# Patient Record
Sex: FEMALE | Race: WHITE
[De-identification: names, ages, dates, MRNs, and addresses within clinical notes are randomized per-mention and may not be internally consistent; named-entity substitution may affect disease eponyms.]

---

## 2018-10-06 ENCOUNTER — HOSPITAL ENCOUNTER (EMERGENCY)
Dept: HOSPITAL 47 - EC | Age: 73
LOS: 1 days | Discharge: HOME | End: 2018-10-07
Payer: MEDICARE

## 2018-10-06 DIAGNOSIS — R40.2142: ICD-10-CM

## 2018-10-06 DIAGNOSIS — Z88.1: ICD-10-CM
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
## 2018-10-06 DIAGNOSIS — R40.2252: ICD-10-CM

## 2018-10-06 DIAGNOSIS — Z88.2: ICD-10-CM
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
## 2018-10-06 DIAGNOSIS — R40.2362: ICD-10-CM

## 2018-10-06 DIAGNOSIS — Z23: ICD-10-CM

## 2018-10-06 DIAGNOSIS — S01.01XA: Primary | ICD-10-CM
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
## 2018-10-06 PROCEDURE — 90471 IMMUNIZATION ADMIN: CPT

## 2018-10-06 PROCEDURE — 72125 CT NECK SPINE W/O DYE: CPT

## 2018-10-06 PROCEDURE — 70450 CT HEAD/BRAIN W/O DYE: CPT

## 2018-10-06 PROCEDURE — 99283 EMERGENCY DEPT VISIT LOW MDM: CPT

## 2018-10-06 PROCEDURE — 90715 TDAP VACCINE 7 YRS/> IM: CPT

## 2018-10-06 PROCEDURE — 12002 RPR S/N/AX/GEN/TRNK2.6-7.5CM: CPT

## 2018-10-07 VITALS — RESPIRATION RATE: 17 BRPM | HEART RATE: 73 BPM | DIASTOLIC BLOOD PRESSURE: 66 MMHG | SYSTOLIC BLOOD PRESSURE: 147 MMHG

## 2018-10-07 VITALS — TEMPERATURE: 98.2 F

## 2018-10-07 NOTE — ED
General Adult HPI





- General


Chief complaint: Head Injury


Stated complaint: head injury


Time Seen by Provider: 10/07/18 01:11


Source: patient, RN notes reviewed


Mode of arrival: ambulatory


Limitations: no limitations





- History of Present Illness


Initial comments: 





73-year-old female with a past medical history of GERD, hyperlipidemia, 

hypertension presents to the emergency department for a chief complaint of head 

injury.  Patient states she was trying to open her storm door when she slipped 

and fell backwards hitting the back of her head.  Patient denies any headache 

at this time but does state the laceration hurts.  Patient denies any loss of 

consciousness.  Patient denies being on any blood thinners.  Patient is not up-

to-date on tetanus.Patient has no other complaints at this time including 

shortness of breath, chest pain, abdominal pain, nausea or vomiting, headache, 

or visual changes.





- Related Data


 Allergies











Allergy/AdvReac Type Severity Reaction Status Date / Time


 


nitrofurantoin Allergy  Swelling Verified 10/07/18 00:09





[From Macrobid]     


 


Sulfa (Sulfonamide Allergy  Swelling Verified 10/07/18 00:09





Antibiotics)     


 


sulfamethoxazole Allergy  Swelling Verified 10/07/18 00:09





[From Bactrim]     


 


trimethoprim [From Bactrim] Allergy  Swelling Verified 10/07/18 00:09














Review of Systems


ROS Statement: 


Those systems with pertinent positive or pertinent negative responses have been 

documented in the HPI.





ROS Other: All systems not noted in ROS Statement are negative.





Past Medical History


Past Medical History: GERD/Reflux, Hyperlipidemia, Hypertension


Additional Past Medical History / Comment(s): currently on antibiotics for 

bladder infection on doxy


History of Any Multi-Drug Resistant Organisms: None Reported


Past Surgical History: Hysterectomy


Past Psychological History: No Psychological Hx Reported


Smoking Status: Never smoker


Past Alcohol Use History: Occasional


Past Drug Use History: None Reported





General Exam


Limitations: no limitations


General appearance: alert, in no apparent distress


Head exam: Present: normocephalic.  Absent: atraumatic (Patient does have a 3 

cm laceration to the right parietal bone)


Eye exam: Present: normal appearance.  Absent: scleral icterus, conjunctival 

injection


ENT exam: Present: normal exam, normal oropharynx, mucous membranes moist, TM's 

normal bilaterally, normal external ear exam


Neck exam: Present: normal inspection, full ROM.  Absent: tenderness, 

meningismus, lymphadenopathy


Respiratory exam: Present: normal lung sounds bilaterally.  Absent: respiratory 

distress, wheezes, rales, rhonchi, stridor


Cardiovascular Exam: Present: regular rate, normal rhythm, normal heart sounds.

  Absent: systolic murmur, diastolic murmur, rubs, gallop, clicks


Neurological exam: Present: alert, oriented X3, CN II-XII intact


  ** Expanded


Patient oriented to: Present: person, place, time


Speech: Present: fluid speech


Cranial nerves: EOM's Intact: Normal, Tongue Deviation: Normal, Nystagmus: 

Normal, Facial Sensation: Normal


Cerebellar function: Finger to Nose: Normal, Heel to Shin: Normal, Romberg: 

Normal


Upper motor neuron: Pronator Drift: Normal


Sensory exam: Upper Extremity Light Touch: Normal, Upper Extremity Pin Prick: 

Normal, Lower Extremity Light Touch: Normal, Lower Extremity Pin Prick: Normal


Motor strength exam: RUE: 5, LUE: 5, RLE: 5, LLE: 5


Eye Response: (4) open spontaneously


Motor Response: (6) obeys commands


Verbal Response: (5) oriented


[place] Total: 15


Psychiatric exam: Present: normal affect (Patient is well-appearing.  She is 

alert and responsive.  She is pleasant and interactive.), normal mood





Course


 Vital Signs











  10/07/18 10/07/18





  00:01 02:46


 


Temperature 98.2 F 


 


Pulse Rate 84 73


 


Respiratory 16 17





Rate  


 


Blood Pressure 141/84 147/66


 


O2 Sat by Pulse 96 99





Oximetry  














Procedures





- Laceration


  ** Laceration #1


Consent Obtained: verbal consent


Time Out Performed: Yes


Indication: laceration


Site: scalp


Size (cm): 3


Description: linear


Depth: simple, single layer


Anesthetic Used: lidocaine 1%


Anesthesia Technique: local infiltration


Amount (mls): 4


Pre-repair: wound explored, irrigated extensively (Irrigated extensively with 

saline), deep structures intact


Type of Sutures: other (Staples)


Number of Sutures: 6


Patient Tolerated Procedure: well, no complications





Medical Decision Making





- Medical Decision Making





73-year-old presents to the emergency room for chief light of head injury.  

Patient fell backwards hitting her head on the cement.  Patient denies loss of 

consciousness or blood thinners.  Patient denies headache at this time but does 

state the area around the laceration is painful.  Patient is not up-to-date on 

tetanus.  On exam no focal neuro deficits.  Patient is ambulatory with a normal 

gait.  No fracture evident in the C-spine.  No acute intracranial abnormality.  

Laceration is about 3 cm on the right parietal bone.  This was cleaned 

thoroughly and stapled.  Patient will follow up with primary care in 1-2 days.  

She will return if Staples removed in 7-10 days.  She will return if she has 

any worsening symptoms.  Concussion precautions were given to her. Discussed 

with Dr Vazquez.





Disposition


Clinical Impression: 


 Head injury





Disposition: HOME SELF-CARE


Condition: Good


Instructions:  Concussion (ED), Staple Care (ED)


Additional Instructions: 


Please use Motrin or Tylenol for pain.  Please have staples removed in 10 days.

  Please return immediately to the emergency department if you have any 

worsening symptoms.


Is patient prescribed a controlled substance at d/c from ED?: No


Referrals: 


Nonstaff,Physician [Primary Care Provider] - 1-2 days


Time of Disposition: 03:18

## 2018-10-07 NOTE — CT
EXAMINATION TYPE: CT brain cspine wo con

 

DATE OF EXAM: 10/7/2018

 

COMPARISON: None

 

HISTORY: fall headache. Neck pain.

 

CT DLP: 1356.50 mGycm

Automated exposure control for dose reduction was used.

 

TECHNIQUE: CT scan of the head and cervical spine are performed without contrast.

 

FINDINGS:   There is some cerebral cortical atrophy. There is no mass effect nor midline shift. There
 is no sign of intracranial hemorrhage. The calvarium is intact. There is mucosal thickening left max
illary sinus.

 

There is some straightening of the cervical spine. There is narrowing of disc spaces at C4-5 C5-6 wit
h spurring of the endplates. The posterior elements are intact. Skull base appears intact. There is n
o evidence of a fracture.

 

IMPRESSION:

Spondylotic changes in the cervical spine. No fracture seen.

 

Cerebral atrophy. No acute intracranial abnormality. Left maxillary sinusitis.